# Patient Record
Sex: FEMALE | URBAN - METROPOLITAN AREA
[De-identification: names, ages, dates, MRNs, and addresses within clinical notes are randomized per-mention and may not be internally consistent; named-entity substitution may affect disease eponyms.]

---

## 2024-10-01 ENCOUNTER — APPOINTMENT (RX ONLY)
Dept: URBAN - METROPOLITAN AREA CLINIC 173 | Facility: CLINIC | Age: 79
Setting detail: DERMATOLOGY
End: 2024-10-01

## 2024-10-01 DIAGNOSIS — Z41.9 ENCOUNTER FOR PROCEDURE FOR PURPOSES OTHER THAN REMEDYING HEALTH STATE, UNSPECIFIED: ICD-10-CM

## 2024-10-01 PROCEDURE — ? COSMETIC CONSULTATION: GENERAL

## 2024-10-01 PROCEDURE — ? COSMETIC SURGICAL QUOTE - FACE

## 2024-10-01 NOTE — HPI: COSMETIC CONSULTATION
Additional History: Patient found out about Dr. Devictor through an online search. Had a facelift 17 years ago and loved the results but in the last few years she has noticed the lower to mid face increasing in laxity. The skin laxity on her neck is also a concern. \\nHad Botox tx last month but is not a fan.

## 2024-10-01 NOTE — PROCEDURE: COSMETIC SURGICAL QUOTE - FACE
Xeomin Price Per Unit: 15
Include Recovery Fees In Quote?: no
Restylane Price Per Syringe: 500
Discount Percentage: 0
Include Anesthesia Fees In Quote?: yes
Facility Initial Fee: [id number]
Procedure Detailed Description: Extended deep plane facelift with Perioral dermabrasion
Surgeon: Dr. Samuel DeVictor
Facility Fee Per 0.25 Hours: 250
Detail Level: Simple
Facility Initial Fee: [id number]
Anesthesia Initial Fee: [phone]
Anesthesia Provider: Anesthesiologist

## 2024-10-10 ENCOUNTER — APPOINTMENT (RX ONLY)
Dept: URBAN - METROPOLITAN AREA CLINIC 173 | Facility: CLINIC | Age: 79
Setting detail: DERMATOLOGY
End: 2024-10-10

## 2024-10-10 DIAGNOSIS — Z41.9 ENCOUNTER FOR PROCEDURE FOR PURPOSES OTHER THAN REMEDYING HEALTH STATE, UNSPECIFIED: ICD-10-CM

## 2024-10-10 PROCEDURE — ? COUNSELING

## 2024-10-10 PROCEDURE — ? PRESCRIPTION

## 2024-10-10 RX ORDER — TRANEXAMIC ACID 650 MG/1
TABLET, FILM COATED ORAL
Qty: 3 | Refills: 0 | Status: ERX | COMMUNITY
Start: 2024-10-10

## 2024-10-10 RX ORDER — CEPHALEXIN 500 MG/1
CAPSULE ORAL
Qty: 10 | Refills: 0 | Status: ERX | COMMUNITY
Start: 2024-10-10

## 2024-10-10 RX ORDER — ONDANSETRON 8 MG/1
TABLET, ORALLY DISINTEGRATING ORAL
Qty: 6 | Refills: 0 | Status: ERX | COMMUNITY
Start: 2024-10-10

## 2024-10-10 RX ORDER — METHYLPREDNISOLONE 4 MG/1
TABLET ORAL
Qty: 1 | Refills: 0 | Status: ERX | COMMUNITY
Start: 2024-10-10

## 2024-10-10 RX ORDER — OXYCODONE HYDROCHLORIDE 10 MG/1
TABLET ORAL
Qty: 8 | Refills: 0 | Status: CANCELLED | COMMUNITY
Start: 2024-10-10

## 2024-10-10 RX ADMIN — METHYLPREDNISOLONE: 4 TABLET ORAL at 00:00

## 2024-10-10 RX ADMIN — ONDANSETRON: 8 TABLET, ORALLY DISINTEGRATING ORAL at 00:00

## 2024-10-10 RX ADMIN — OXYCODONE HYDROCHLORIDE: 10 TABLET ORAL at 00:00

## 2024-10-10 RX ADMIN — TRANEXAMIC ACID: 650 TABLET, FILM COATED ORAL at 00:00

## 2024-10-10 RX ADMIN — CEPHALEXIN: 500 CAPSULE ORAL at 00:00

## 2024-11-07 ENCOUNTER — APPOINTMENT (RX ONLY)
Dept: URBAN - METROPOLITAN AREA CLINIC 173 | Facility: CLINIC | Age: 79
Setting detail: DERMATOLOGY
End: 2024-11-07

## 2024-11-07 DIAGNOSIS — Z41.9 ENCOUNTER FOR PROCEDURE FOR PURPOSES OTHER THAN REMEDYING HEALTH STATE, UNSPECIFIED: ICD-10-CM

## 2024-11-07 PROCEDURE — ? OPERATIVE NOTE - TRANSCRIPT

## 2024-11-08 ENCOUNTER — APPOINTMENT (RX ONLY)
Dept: URBAN - METROPOLITAN AREA CLINIC 173 | Facility: CLINIC | Age: 79
Setting detail: DERMATOLOGY
End: 2024-11-08

## 2024-11-08 DIAGNOSIS — Z41.9 ENCOUNTER FOR PROCEDURE FOR PURPOSES OTHER THAN REMEDYING HEALTH STATE, UNSPECIFIED: ICD-10-CM

## 2024-11-08 PROCEDURE — ? POST-OP WOUND CHECK

## 2024-11-08 ASSESSMENT — LOCATION ZONE DERM: LOCATION ZONE: FACE

## 2024-11-08 ASSESSMENT — LOCATION SIMPLE DESCRIPTION DERM
LOCATION SIMPLE: LEFT CHEEK
LOCATION SIMPLE: SUBMENTAL CHIN
LOCATION SIMPLE: RIGHT CHEEK

## 2024-11-08 ASSESSMENT — LOCATION DETAILED DESCRIPTION DERM
LOCATION DETAILED: LEFT SUPERIOR PREAURICULAR CHEEK
LOCATION DETAILED: RIGHT SUPERIOR PREAURICULAR CHEEK
LOCATION DETAILED: SUBMENTAL CHIN

## 2024-11-12 ENCOUNTER — APPOINTMENT (RX ONLY)
Dept: URBAN - METROPOLITAN AREA CLINIC 173 | Facility: CLINIC | Age: 79
Setting detail: DERMATOLOGY
End: 2024-11-12

## 2024-11-12 DIAGNOSIS — Z41.9 ENCOUNTER FOR PROCEDURE FOR PURPOSES OTHER THAN REMEDYING HEALTH STATE, UNSPECIFIED: ICD-10-CM

## 2024-11-12 PROCEDURE — ? POST-OP WOUND CHECK

## 2024-11-12 ASSESSMENT — LOCATION SIMPLE DESCRIPTION DERM
LOCATION SIMPLE: LEFT CHEEK
LOCATION SIMPLE: RIGHT CHEEK
LOCATION SIMPLE: SUBMENTAL CHIN

## 2024-11-12 ASSESSMENT — LOCATION DETAILED DESCRIPTION DERM
LOCATION DETAILED: SUBMENTAL CHIN
LOCATION DETAILED: RIGHT SUPERIOR PREAURICULAR CHEEK
LOCATION DETAILED: LEFT SUPERIOR PREAURICULAR CHEEK

## 2024-11-12 ASSESSMENT — LOCATION ZONE DERM: LOCATION ZONE: FACE

## 2024-11-15 ENCOUNTER — APPOINTMENT (RX ONLY)
Dept: URBAN - METROPOLITAN AREA CLINIC 173 | Facility: CLINIC | Age: 79
Setting detail: DERMATOLOGY
End: 2024-11-15

## 2024-11-15 DIAGNOSIS — Z41.9 ENCOUNTER FOR PROCEDURE FOR PURPOSES OTHER THAN REMEDYING HEALTH STATE, UNSPECIFIED: ICD-10-CM

## 2024-11-15 PROCEDURE — ? SUTURE REMOVAL

## 2024-11-15 ASSESSMENT — LOCATION ZONE DERM: LOCATION ZONE: FACE

## 2024-11-15 ASSESSMENT — LOCATION SIMPLE DESCRIPTION DERM
LOCATION SIMPLE: LEFT CHEEK
LOCATION SIMPLE: SUBMENTAL CHIN
LOCATION SIMPLE: RIGHT CHEEK

## 2024-11-15 NOTE — PROCEDURE: SUTURE REMOVAL
Detail Level: Detailed
Add 1585x Cpt? (Do Not Bill If You Billed For The Procedure Placing The Sutures. This Is An Add-On Code That Must Be Billed With An E/M Visit Code): No
Suture Removal Completed By (Optional): Suzette Tapia

## 2024-11-17 ENCOUNTER — RX ONLY (OUTPATIENT)
Age: 79
Setting detail: RX ONLY
End: 2024-11-17

## 2024-11-17 RX ORDER — CEPHALEXIN 500 MG/1
1 TABLET TABLET ORAL EVERY 8 HOURS
Qty: 30 | Refills: 0 | Status: ERX | COMMUNITY
Start: 2024-11-17

## 2024-11-19 ENCOUNTER — APPOINTMENT (RX ONLY)
Dept: URBAN - METROPOLITAN AREA CLINIC 173 | Facility: CLINIC | Age: 79
Setting detail: DERMATOLOGY
End: 2024-11-19

## 2024-11-19 DIAGNOSIS — Z41.9 ENCOUNTER FOR PROCEDURE FOR PURPOSES OTHER THAN REMEDYING HEALTH STATE, UNSPECIFIED: ICD-10-CM

## 2024-11-19 PROCEDURE — ? POST-OP WOUND CHECK

## 2024-11-19 ASSESSMENT — LOCATION SIMPLE DESCRIPTION DERM
LOCATION SIMPLE: RIGHT CHEEK
LOCATION SIMPLE: NECK

## 2024-11-19 ASSESSMENT — LOCATION ZONE DERM
LOCATION ZONE: FACE
LOCATION ZONE: NECK

## 2024-11-19 ASSESSMENT — LOCATION DETAILED DESCRIPTION DERM
LOCATION DETAILED: RIGHT SUPERIOR PREAURICULAR CHEEK
LOCATION DETAILED: RIGHT SUPERIOR LATERAL NECK

## 2024-11-19 NOTE — PROCEDURE: POST-OP WOUND CHECK
Detail Level: Detailed
Add 1585x Cpt? (Do Not Bill If You Billed For The Procedure Placing The Sutures. This Is An Add-On Code That Must Be Billed With An E/M Visit Code): No
Additional Comments: Patient reported that this past Sunday she started having localized tenderness, yellow drainage and erythema surrounding the site where the GRAYSON drain was removed. She called Dr. DeVictor and was prescribed of PO cephalexin which she currently still taking. Plan to close hole next week after she has completed the full course of antibiotics. Instructed patient to keep clean and covered with a bandage and to call of infection symptoms become worse.

## 2024-11-26 ENCOUNTER — APPOINTMENT (RX ONLY)
Dept: URBAN - METROPOLITAN AREA CLINIC 173 | Facility: CLINIC | Age: 79
Setting detail: DERMATOLOGY
End: 2024-11-26

## 2024-11-26 DIAGNOSIS — L81.1 CHLOASMA: ICD-10-CM

## 2024-11-26 DIAGNOSIS — Z41.9 ENCOUNTER FOR PROCEDURE FOR PURPOSES OTHER THAN REMEDYING HEALTH STATE, UNSPECIFIED: ICD-10-CM

## 2024-11-26 PROCEDURE — ? PRESCRIPTION

## 2024-11-26 PROCEDURE — ? NECK LIFT

## 2024-11-26 RX ORDER — PHARMACY COMPOUNDING ACCESSORY
G EACH MISCELLANEOUS NIGHTLY
Qty: 30 | Refills: 1 | Status: CANCELLED | COMMUNITY
Start: 2024-11-26

## 2024-11-26 RX ADMIN — Medication G: at 00:00

## 2024-11-26 NOTE — PROCEDURE: NECK LIFT
Local Anesthesia Volume In Cc: 0.5
Assistant: Susan Dsouza RN
Positioning: The patient was brought to the operating room and placed in the standard supine position in the usual manner. After placement of monitors, anesthesia was achieved as above uneventfully.
Estimated Blood Loss (Cc): minimal
Postauricular Deep Dermal Sutures: 4-0 Monocryl
Detail Level: Zone
Hemostasis: electrocautery
Liposuction Volume In Cc: 0
Postauricular Epidermal Sutures: 5-0 Ethilon
Surgeon: TEDDY
Consent: The risks, benefits, expectations and alternatives of a neck lift were discussed with the patient including, but not limited to, infection, bleeding, injury to nerves, blood vessels or other structures, delayed healing, visible scarring, contour irregularities, asymmetry, cosmetic dissatisfaction and unplanned return to the operating room. The patient read and signed the consent form and verbalized full understanding. A time-out was performed confirming the identities of the patient and surgeon, the operative site(s) and the operative plan per the informed consent.

## 2024-12-05 ENCOUNTER — APPOINTMENT (RX ONLY)
Dept: URBAN - METROPOLITAN AREA CLINIC 173 | Facility: CLINIC | Age: 79
Setting detail: DERMATOLOGY
End: 2024-12-05

## 2024-12-05 DIAGNOSIS — Z41.9 ENCOUNTER FOR PROCEDURE FOR PURPOSES OTHER THAN REMEDYING HEALTH STATE, UNSPECIFIED: ICD-10-CM

## 2024-12-05 PROCEDURE — ? SUTURE REMOVAL

## 2024-12-05 PROCEDURE — ? POST-OP WOUND CHECK

## 2024-12-05 ASSESSMENT — LOCATION ZONE DERM
LOCATION ZONE: NECK
LOCATION ZONE: FACE

## 2024-12-05 ASSESSMENT — LOCATION SIMPLE DESCRIPTION DERM
LOCATION SIMPLE: RIGHT CHEEK
LOCATION SIMPLE: NECK

## 2024-12-05 ASSESSMENT — LOCATION DETAILED DESCRIPTION DERM
LOCATION DETAILED: RIGHT CENTRAL POSTERIOR NECK
LOCATION DETAILED: RIGHT SUPERIOR PREAURICULAR CHEEK

## 2024-12-05 NOTE — PROCEDURE: SUTURE REMOVAL
Detail Level: Detailed
Add 1585x Cpt? (Do Not Bill If You Billed For The Procedure Placing The Sutures. This Is An Add-On Code That Must Be Billed With An E/M Visit Code): No
Suture Removal Completed By (Optional): Farzana Machado, RN

## 2024-12-05 NOTE — PROCEDURE: POST-OP WOUND CHECK
Detail Level: Detailed
Add 1585x Cpt? (Do Not Bill If You Billed For The Procedure Placing The Sutures. This Is An Add-On Code That Must Be Billed With An E/M Visit Code): No
Additional Comments: Discussed laser with alyssa or watchmaker to target color and lingering bruising on face. Patient instructed to start scar cream and massaging on all incisions. Follow up in 1 month for wound check .

## 2024-12-10 ENCOUNTER — APPOINTMENT (RX ONLY)
Dept: URBAN - METROPOLITAN AREA CLINIC 173 | Facility: CLINIC | Age: 79
Setting detail: DERMATOLOGY
End: 2024-12-10

## 2024-12-10 DIAGNOSIS — Z41.9 ENCOUNTER FOR PROCEDURE FOR PURPOSES OTHER THAN REMEDYING HEALTH STATE, UNSPECIFIED: ICD-10-CM

## 2024-12-10 PROCEDURE — ? PULSED-DYE LASER

## 2024-12-10 PROCEDURE — ? COSMETIC CONSULTATION - PULSED-DYE LASER

## 2024-12-10 ASSESSMENT — LOCATION SIMPLE DESCRIPTION DERM
LOCATION SIMPLE: LEFT CHEEK
LOCATION SIMPLE: RIGHT CHEEK

## 2024-12-10 ASSESSMENT — LOCATION DETAILED DESCRIPTION DERM
LOCATION DETAILED: RIGHT INFERIOR CENTRAL MALAR CHEEK
LOCATION DETAILED: LEFT INFERIOR CENTRAL MALAR CHEEK

## 2024-12-10 ASSESSMENT — LOCATION ZONE DERM: LOCATION ZONE: FACE

## 2024-12-10 NOTE — PROCEDURE: PULSED-DYE LASER
Post-Procedure Care: Post care reviewed with patient.
Spot Size: 7 mm
Pulse Duration: 10 ms
Detail Level: Zone
Price (Use Numbers Only, No Special Characters Or $): 150
Delay Time (Ms): 20
Fluence In J/Cm2 (Optional): 7.75
Spot Size: 10 mm
Cryogen Time (Ms): 30
Laser Type: Vbeam 595nm
Consent: Written consent obtained, risks reviewed including but not limited to crusting, scabbing, blistering, scarring, darker or lighter pigmentary change, incidental hair removal, bruising, and/or incomplete removal.
Pulse Count: 14
Pulse Duration: 6 ms
Post-Care Instructions: I reviewed with the patient in detail post-care instructions. Patient should stay away from the sun and wear sun protection until treated areas are fully healed.
Location (Required For Details To Render In Note But Body Touch Will Also Count For First Location): cheeks

## 2025-01-17 ENCOUNTER — APPOINTMENT (OUTPATIENT)
Dept: URBAN - METROPOLITAN AREA CLINIC 173 | Facility: CLINIC | Age: 80
Setting detail: DERMATOLOGY
End: 2025-01-17

## 2025-01-17 DIAGNOSIS — Z41.9 ENCOUNTER FOR PROCEDURE FOR PURPOSES OTHER THAN REMEDYING HEALTH STATE, UNSPECIFIED: ICD-10-CM

## 2025-01-17 PROCEDURE — ? FACIAL

## 2025-01-17 PROCEDURE — ? POST-OP WOUND CHECK

## 2025-01-17 PROCEDURE — ? DIAGNOSIS COMMENT

## 2025-01-17 ASSESSMENT — LOCATION SIMPLE DESCRIPTION DERM
LOCATION SIMPLE: RIGHT CHEEK
LOCATION SIMPLE: LEFT CHEEK

## 2025-01-17 ASSESSMENT — LOCATION DETAILED DESCRIPTION DERM
LOCATION DETAILED: LEFT INFERIOR CENTRAL MALAR CHEEK
LOCATION DETAILED: RIGHT SUPERIOR PREAURICULAR CHEEK

## 2025-01-17 ASSESSMENT — LOCATION ZONE DERM
LOCATION ZONE: FACE
LOCATION ZONE: FACE

## 2025-01-17 NOTE — PROCEDURE: FACIAL
Exfoliation Type: enzyme
Detail Level: Zone
Comments (Sticky): Gogi berry masque, dorian trio & Elta Md uv clear
Facial Steaming: steamed
Mask Type (Optional): fruit mask
Treatment Serum Override: alto advanced
Exfoliation Type Override: coconut papaya
Treatment Serum (Optional): Vitamin C
Price (Use Numbers Only, No Special Characters Or $): 0

## 2025-04-22 ENCOUNTER — APPOINTMENT (OUTPATIENT)
Dept: URBAN - METROPOLITAN AREA CLINIC 173 | Facility: CLINIC | Age: 80
Setting detail: DERMATOLOGY
End: 2025-04-22

## 2025-04-22 DIAGNOSIS — Z41.9 ENCOUNTER FOR PROCEDURE FOR PURPOSES OTHER THAN REMEDYING HEALTH STATE, UNSPECIFIED: ICD-10-CM

## 2025-04-22 PROCEDURE — ? POST-OP WOUND CHECK

## 2025-04-22 PROCEDURE — ? ELLACOR MICRO-CORING

## 2025-04-22 ASSESSMENT — LOCATION DETAILED DESCRIPTION DERM: LOCATION DETAILED: RIGHT SUPERIOR PREAURICULAR CHEEK

## 2025-04-22 ASSESSMENT — LOCATION SIMPLE DESCRIPTION DERM: LOCATION SIMPLE: RIGHT CHEEK

## 2025-04-22 ASSESSMENT — LOCATION ZONE DERM: LOCATION ZONE: FACE

## 2025-04-22 NOTE — PROCEDURE: ELLACOR MICRO-CORING
Number Of Prepaid Treatments (Will Not Render If 0): 0
Location #4: jawline
Core Depth In Mm: 3
Detail Level: Zone
Skin Removal Percentage: 7
Core Depth In Mm: 3.5
Topical Anesthesia?: 30% lidocaine, plasticized base
Location #1: cheeks
Consent: Written consent obtained, risks reviewed including but not limited to: redness, swelling, bruising, burning, dryness, roughness, tightness/pulling of skin, crusting, pain/discomfort, tenderness, tingling, bleeding, numbness, skin peeling, circular marks on the skin, itching, hyper/hypopigmentation, infection, and scarring.    Patient understands the procedure is cosmetic in nature and will require out of pocket payment.
Core Depth In Mm: 2.5
Location #2: lateral upper cutaneous lip
Anesthesia Type: 1% lidocaine with epinephrine and a 1:10 solution of 8.4% sodium bicarbonate
Anesthesia Volume In Cc: 20
Core Depth In Mm: 2
Length Of Topical Anesthesia Application (Optional): 60 minutes
Treatment Number (Optional): 1
Price (Use Numbers Only, No Special Characters Or $): 1062
Location #3: central upper cutaneous lip, lower lip, chin
Post-Care Instructions: After the procedure, keep the treated area covered with ointment for 1-2 days.  Avoid makeup and harsh skin products for one week.   Practice diligent sun protection and sun avoidance.  Typical downtime is 3 days to 1 week. Hydrinity post care kit provided to patient.
Total Number Of Cores: 7941